# Patient Record
Sex: FEMALE | Race: WHITE | Employment: FULL TIME | URBAN - METROPOLITAN AREA
[De-identification: names, ages, dates, MRNs, and addresses within clinical notes are randomized per-mention and may not be internally consistent; named-entity substitution may affect disease eponyms.]

---

## 2018-07-07 PROBLEM — F10.99 ALCOHOL RELATED DISORDER (HCC): Status: ACTIVE | Noted: 2018-07-07

## 2018-07-08 PROBLEM — F33.2 SEVERE RECURRENT MAJOR DEPRESSION WITHOUT PSYCHOTIC FEATURES (HCC): Status: ACTIVE | Noted: 2018-07-08

## 2018-07-08 PROBLEM — F10.99 ALCOHOL RELATED DISORDER (HCC): Status: RESOLVED | Noted: 2018-07-07 | Resolved: 2018-07-08

## 2018-07-09 ENCOUNTER — PATIENT OUTREACH (OUTPATIENT)
Dept: CASE MANAGEMENT | Age: 43
End: 2018-07-09

## 2018-07-09 NOTE — PROGRESS NOTES
Called patient and left voice mail. Requested she call me back for LISA OLIVA Call. Informed her that I would be in office until 8:30pm this evening.

## 2021-04-09 DIAGNOSIS — Z23 NEED FOR VACCINATION: ICD-10-CM
